# Patient Record
Sex: FEMALE | Race: WHITE | HISPANIC OR LATINO | URBAN - METROPOLITAN AREA
[De-identification: names, ages, dates, MRNs, and addresses within clinical notes are randomized per-mention and may not be internally consistent; named-entity substitution may affect disease eponyms.]

---

## 2017-01-10 ENCOUNTER — EMERGENCY (EMERGENCY)
Facility: HOSPITAL | Age: 77
LOS: 0 days | Discharge: HOME | End: 2017-01-10
Admitting: INTERNAL MEDICINE

## 2017-06-27 DIAGNOSIS — R19.7 DIARRHEA, UNSPECIFIED: ICD-10-CM

## 2017-06-27 DIAGNOSIS — Z90.49 ACQUIRED ABSENCE OF OTHER SPECIFIED PARTS OF DIGESTIVE TRACT: ICD-10-CM

## 2017-06-27 DIAGNOSIS — I10 ESSENTIAL (PRIMARY) HYPERTENSION: ICD-10-CM

## 2017-06-27 DIAGNOSIS — E86.0 DEHYDRATION: ICD-10-CM

## 2019-01-09 ENCOUNTER — OUTPATIENT (OUTPATIENT)
Dept: OUTPATIENT SERVICES | Facility: HOSPITAL | Age: 79
LOS: 1 days | Discharge: HOME | End: 2019-01-09

## 2019-01-09 VITALS
HEIGHT: 60 IN | SYSTOLIC BLOOD PRESSURE: 146 MMHG | HEART RATE: 79 BPM | DIASTOLIC BLOOD PRESSURE: 75 MMHG | TEMPERATURE: 96 F | RESPIRATION RATE: 17 BRPM | WEIGHT: 143.08 LBS | OXYGEN SATURATION: 98 %

## 2019-01-09 VITALS — RESPIRATION RATE: 17 BRPM | SYSTOLIC BLOOD PRESSURE: 133 MMHG | HEART RATE: 70 BPM | DIASTOLIC BLOOD PRESSURE: 62 MMHG

## 2019-01-09 DIAGNOSIS — Z98.890 OTHER SPECIFIED POSTPROCEDURAL STATES: Chronic | ICD-10-CM

## 2019-01-14 DIAGNOSIS — H52.4 PRESBYOPIA: ICD-10-CM

## 2019-01-14 DIAGNOSIS — H25.9 UNSPECIFIED AGE-RELATED CATARACT: ICD-10-CM

## 2019-01-14 DIAGNOSIS — Z88.0 ALLERGY STATUS TO PENICILLIN: ICD-10-CM

## 2019-01-29 NOTE — ASU PATIENT PROFILE, ADULT - PMH
Hypertension, unspecified type    Upper respiratory tract infection, unspecified type  patient states she has since recovered from the URI from her last visit

## 2019-01-30 ENCOUNTER — OUTPATIENT (OUTPATIENT)
Dept: OUTPATIENT SERVICES | Facility: HOSPITAL | Age: 79
LOS: 1 days | Discharge: HOME | End: 2019-01-30

## 2019-01-30 VITALS — SYSTOLIC BLOOD PRESSURE: 138 MMHG | RESPIRATION RATE: 17 BRPM | DIASTOLIC BLOOD PRESSURE: 86 MMHG | HEART RATE: 57 BPM

## 2019-01-30 VITALS
HEART RATE: 60 BPM | DIASTOLIC BLOOD PRESSURE: 68 MMHG | WEIGHT: 145.06 LBS | OXYGEN SATURATION: 98 % | RESPIRATION RATE: 16 BRPM | TEMPERATURE: 96 F | SYSTOLIC BLOOD PRESSURE: 149 MMHG

## 2019-01-30 DIAGNOSIS — Z98.890 OTHER SPECIFIED POSTPROCEDURAL STATES: Chronic | ICD-10-CM

## 2019-01-30 RX ORDER — LISINOPRIL 2.5 MG/1
0 TABLET ORAL
Qty: 0 | Refills: 0 | COMMUNITY

## 2019-01-30 NOTE — PRE-ANESTHESIA EVALUATION ADULT - NSANTHOSAYNRD_GEN_A_CORE
No. LG screening performed.  STOP BANG Legend: 0-2 = LOW Risk; 3-4 = INTERMEDIATE Risk; 5-8 = HIGH Risk

## 2019-02-07 DIAGNOSIS — H26.9 UNSPECIFIED CATARACT: ICD-10-CM

## 2019-02-07 DIAGNOSIS — I10 ESSENTIAL (PRIMARY) HYPERTENSION: ICD-10-CM

## 2020-03-04 NOTE — PRE-ANESTHESIA EVALUATION ADULT - NSANTHRISKNONERD_GEN_ALL_CORE
See the printed discharge instructions.  You may use Tylenol and/or ibuprofen as directed for pain if necessary.  Follow-up at the [facility] for wound check and suture removed.  Return to the emergency department immediately for any signs of infection.  
No risk alerts present

## 2022-03-19 NOTE — ASU PATIENT PROFILE, ADULT - BRADEN SCORE (IF 18 OR LESS ACTIVATE SKIN INJURY RISK INCREASED GUIDELINE), MLM
The patient is Stable - Low risk of patient condition declining or worsening    Shift Goals  Clinical Goals: Pain management, discharge planning  Patient Goals: Pain management, discharge  Family Goals: n/a    Progress made toward(s) clinical / shift goals:    Problem: Knowledge Deficit - Standard  Goal: Patient and family/care givers will demonstrate understanding of plan of care, disease process/condition, diagnostic tests and medications  3/19/2022 1006 by Abby Mcmillan R.N.  Outcome: Progressing  3/19/2022 1005 by Abby Mcmillan R.N.  Outcome: Progressing     Problem: Pain - Standard  Goal: Alleviation of pain or a reduction in pain to the patient’s comfort goal  3/19/2022 1006 by Abby Mcmillan R.N.  Outcome: Progressing  3/19/2022 1005 by Abby Mcmillan R.N.  Outcome: Progressing     Problem: Psychosocial  Goal: Patient's level of anxiety will decrease  Outcome: Progressing     Problem: Communication  Goal: The ability to communicate needs accurately and effectively will improve  Outcome: Progressing       Patient is not progressing towards the following goals:       22

## 2024-02-09 NOTE — ASU PATIENT PROFILE, ADULT - ATTEMPT TO OOB
Number Of Freeze-Thaw Cycles: 1 freeze-thaw cycle Post-Care Instructions: You have just had cryotherapy for the treatment of a pre-cancerous or other benign (non-cancerous) skin lesions. You can expect the pain to rapidly decrease over the next 45 minutes. The area treated will initially turn red and over the next 72 hours turn brown to black. A scab will form that will peel off by itself within 5 to 7 days. A blister or reddish purple blood blister may form where the area has been treated. When the scab forms, you can apply Vaseline or Scar Recovery Gel twice a day to the wound. This product will improve the healing of the wound, decreasing the appearance of red or pink pigment changes in the wound. The gel has also been shown to significantly decrease itching while the wound heals. You can purchase the Scar Recovery Gel at our office. \\n\\nCan I wash or use makeup? Yes\\n\\nShould I break the blister should one form? \\nIf the blister is bothersome, you may break the blister with a clean needle if the lesion is on the body. However, we do not recommend doing this for lesions on the face. Keep the area dry and as clean as possible in order to prevent infection. Natural skin is the best protection to prevent infection. \\n\\nWill this leave a scar? \\nIt could, but it is very unlikely. However, it may leave a slight discoloration, which should be temporary. \\n\\nWhat if the skin growth doesn't go away after this has healed? \\nThe providers treated this area believing it did not have cancerous roots and was strictly limited to the surface of the skin as a pre-cancerous or benign growth would be. The growth may not disappear or it may return over a period of time. You need to have this area checked again at your follow-up appointment to ensure that it does not need further treatment or that it has resolved. Duration Of Freeze Thaw-Cycle (Seconds): 8 Render Note In Bullet Format When Appropriate: No Detail Level: Detailed Consent: The patient's consent was obtained including but not limited to risks of crusting, scabbing, blistering, scarring, darker or lighter pigmentary change, recurrence, incomplete removal and infection. Show Aperture Variable?: Yes Application Tool (Optional): Liquid Nitrogen Sprayer no